# Patient Record
Sex: FEMALE | Race: WHITE
[De-identification: names, ages, dates, MRNs, and addresses within clinical notes are randomized per-mention and may not be internally consistent; named-entity substitution may affect disease eponyms.]

---

## 2018-08-09 ENCOUNTER — HOSPITAL ENCOUNTER (OUTPATIENT)
Dept: HOSPITAL 53 - M SDC | Age: 33
Discharge: HOME | End: 2018-08-09
Attending: OBSTETRICS & GYNECOLOGY
Payer: MEDICARE

## 2018-08-09 DIAGNOSIS — Z88.0: ICD-10-CM

## 2018-08-09 DIAGNOSIS — O02.1: Primary | ICD-10-CM

## 2018-08-09 DIAGNOSIS — Z88.2: ICD-10-CM

## 2018-08-09 LAB
ANION GAP: 8 MEQ/L (ref 8–16)
BLOOD UREA NITROGEN: 7 MG/DL (ref 7–18)
CALCIUM LEVEL: 9.9 MG/DL (ref 8.5–10.1)
CARBON DIOXIDE LEVEL: 27 MEQ/L (ref 21–32)
CHLORIDE LEVEL: 104 MEQ/L (ref 98–107)
CREATININE FOR GFR: 0.58 MG/DL (ref 0.55–1.3)
GFR SERPL CREATININE-BSD FRML MDRD: > 60 ML/MIN/{1.73_M2} (ref 60–?)
GLUCOSE, FASTING: 95 MG/DL (ref 70–100)
HEMATOCRIT: 39.2 % (ref 36–47)
HEMOGLOBIN: 13.5 G/DL (ref 12–15.5)
MEAN CORPUSCULAR HEMOGLOBIN: 31.6 PG (ref 27–33)
MEAN CORPUSCULAR HGB CONC: 34.4 G/DL (ref 32–36.5)
MEAN CORPUSCULAR VOLUME: 91.8 FL (ref 80–96)
NRBC BLD AUTO-RTO: 0 % (ref 0–0)
PLATELET COUNT, AUTOMATED: 323 10^3/UL (ref 150–450)
POTASSIUM SERUM: 4.1 MEQ/L (ref 3.5–5.1)
RED BLOOD COUNT: 4.27 10^6/UL (ref 4–5.4)
RED CELL DISTRIBUTION WIDTH: 12 % (ref 11.5–14.5)
SODIUM LEVEL: 139 MEQ/L (ref 136–145)
WHITE BLOOD COUNT: 6.6 10^3/UL (ref 4–10)

## 2018-08-09 PROCEDURE — 59820 CARE OF MISCARRIAGE: CPT

## 2018-08-09 RX ADMIN — ACETAMINOPHEN 1 MG: 650 SUPPOSITORY RECTAL at 19:50

## 2018-08-09 RX ADMIN — CLINDAMYCIN PHOSPHATE 1 MG: 900 INJECTION, SOLUTION INTRAVENOUS at 19:59

## 2019-10-11 ENCOUNTER — HOSPITAL ENCOUNTER (INPATIENT)
Dept: HOSPITAL 53 - M LDO | Age: 34
LOS: 2 days | Discharge: HOME | End: 2019-10-13
Attending: OBSTETRICS & GYNECOLOGY | Admitting: OBSTETRICS & GYNECOLOGY
Payer: MEDICARE

## 2019-10-11 VITALS — SYSTOLIC BLOOD PRESSURE: 165 MMHG | DIASTOLIC BLOOD PRESSURE: 81 MMHG

## 2019-10-11 VITALS — SYSTOLIC BLOOD PRESSURE: 103 MMHG | DIASTOLIC BLOOD PRESSURE: 57 MMHG

## 2019-10-11 VITALS — WEIGHT: 215.39 LBS | HEIGHT: 66 IN | BODY MASS INDEX: 34.62 KG/M2

## 2019-10-11 VITALS — SYSTOLIC BLOOD PRESSURE: 105 MMHG | DIASTOLIC BLOOD PRESSURE: 58 MMHG

## 2019-10-11 VITALS — SYSTOLIC BLOOD PRESSURE: 94 MMHG | DIASTOLIC BLOOD PRESSURE: 54 MMHG

## 2019-10-11 VITALS — DIASTOLIC BLOOD PRESSURE: 56 MMHG | SYSTOLIC BLOOD PRESSURE: 108 MMHG

## 2019-10-11 VITALS — SYSTOLIC BLOOD PRESSURE: 106 MMHG | DIASTOLIC BLOOD PRESSURE: 57 MMHG

## 2019-10-11 VITALS — SYSTOLIC BLOOD PRESSURE: 106 MMHG | DIASTOLIC BLOOD PRESSURE: 55 MMHG

## 2019-10-11 VITALS — DIASTOLIC BLOOD PRESSURE: 62 MMHG | SYSTOLIC BLOOD PRESSURE: 110 MMHG

## 2019-10-11 VITALS — DIASTOLIC BLOOD PRESSURE: 61 MMHG | SYSTOLIC BLOOD PRESSURE: 97 MMHG

## 2019-10-11 VITALS — DIASTOLIC BLOOD PRESSURE: 59 MMHG | SYSTOLIC BLOOD PRESSURE: 100 MMHG

## 2019-10-11 VITALS — SYSTOLIC BLOOD PRESSURE: 112 MMHG | DIASTOLIC BLOOD PRESSURE: 65 MMHG

## 2019-10-11 VITALS — DIASTOLIC BLOOD PRESSURE: 79 MMHG | SYSTOLIC BLOOD PRESSURE: 139 MMHG

## 2019-10-11 VITALS — SYSTOLIC BLOOD PRESSURE: 103 MMHG | DIASTOLIC BLOOD PRESSURE: 58 MMHG

## 2019-10-11 VITALS — DIASTOLIC BLOOD PRESSURE: 77 MMHG | SYSTOLIC BLOOD PRESSURE: 122 MMHG

## 2019-10-11 VITALS — DIASTOLIC BLOOD PRESSURE: 50 MMHG | SYSTOLIC BLOOD PRESSURE: 102 MMHG

## 2019-10-11 VITALS — SYSTOLIC BLOOD PRESSURE: 118 MMHG | DIASTOLIC BLOOD PRESSURE: 87 MMHG

## 2019-10-11 VITALS — DIASTOLIC BLOOD PRESSURE: 70 MMHG | SYSTOLIC BLOOD PRESSURE: 112 MMHG

## 2019-10-11 VITALS — DIASTOLIC BLOOD PRESSURE: 53 MMHG | SYSTOLIC BLOOD PRESSURE: 90 MMHG

## 2019-10-11 VITALS — SYSTOLIC BLOOD PRESSURE: 102 MMHG | DIASTOLIC BLOOD PRESSURE: 56 MMHG

## 2019-10-11 VITALS — SYSTOLIC BLOOD PRESSURE: 134 MMHG | DIASTOLIC BLOOD PRESSURE: 77 MMHG

## 2019-10-11 VITALS — SYSTOLIC BLOOD PRESSURE: 104 MMHG | DIASTOLIC BLOOD PRESSURE: 56 MMHG

## 2019-10-11 VITALS — SYSTOLIC BLOOD PRESSURE: 121 MMHG | DIASTOLIC BLOOD PRESSURE: 91 MMHG

## 2019-10-11 VITALS — SYSTOLIC BLOOD PRESSURE: 107 MMHG | DIASTOLIC BLOOD PRESSURE: 64 MMHG

## 2019-10-11 VITALS — SYSTOLIC BLOOD PRESSURE: 93 MMHG | DIASTOLIC BLOOD PRESSURE: 55 MMHG

## 2019-10-11 VITALS — SYSTOLIC BLOOD PRESSURE: 92 MMHG | DIASTOLIC BLOOD PRESSURE: 59 MMHG

## 2019-10-11 VITALS — DIASTOLIC BLOOD PRESSURE: 67 MMHG | SYSTOLIC BLOOD PRESSURE: 112 MMHG

## 2019-10-11 VITALS — DIASTOLIC BLOOD PRESSURE: 59 MMHG | SYSTOLIC BLOOD PRESSURE: 103 MMHG

## 2019-10-11 VITALS — DIASTOLIC BLOOD PRESSURE: 76 MMHG | SYSTOLIC BLOOD PRESSURE: 131 MMHG

## 2019-10-11 VITALS — DIASTOLIC BLOOD PRESSURE: 73 MMHG | SYSTOLIC BLOOD PRESSURE: 127 MMHG

## 2019-10-11 VITALS — DIASTOLIC BLOOD PRESSURE: 86 MMHG | SYSTOLIC BLOOD PRESSURE: 149 MMHG

## 2019-10-11 VITALS — DIASTOLIC BLOOD PRESSURE: 61 MMHG | SYSTOLIC BLOOD PRESSURE: 111 MMHG

## 2019-10-11 VITALS — DIASTOLIC BLOOD PRESSURE: 71 MMHG | SYSTOLIC BLOOD PRESSURE: 122 MMHG

## 2019-10-11 VITALS — DIASTOLIC BLOOD PRESSURE: 57 MMHG | SYSTOLIC BLOOD PRESSURE: 93 MMHG

## 2019-10-11 VITALS — DIASTOLIC BLOOD PRESSURE: 70 MMHG | SYSTOLIC BLOOD PRESSURE: 89 MMHG

## 2019-10-11 VITALS — DIASTOLIC BLOOD PRESSURE: 59 MMHG | SYSTOLIC BLOOD PRESSURE: 92 MMHG

## 2019-10-11 VITALS — DIASTOLIC BLOOD PRESSURE: 57 MMHG | SYSTOLIC BLOOD PRESSURE: 116 MMHG

## 2019-10-11 VITALS — DIASTOLIC BLOOD PRESSURE: 55 MMHG | SYSTOLIC BLOOD PRESSURE: 104 MMHG

## 2019-10-11 DIAGNOSIS — F41.9: ICD-10-CM

## 2019-10-11 DIAGNOSIS — Z3A.38: ICD-10-CM

## 2019-10-11 LAB
HCT VFR BLD AUTO: 39.4 % (ref 36–47)
HGB BLD-MCNC: 13.3 G/DL (ref 12–15.5)
MCH RBC QN AUTO: 32.8 PG (ref 27–33)
MCHC RBC AUTO-ENTMCNC: 33.8 G/DL (ref 32–36.5)
MCV RBC AUTO: 97.3 FL (ref 80–96)
PLATELET # BLD AUTO: 206 10^3/UL (ref 150–450)
RBC # BLD AUTO: 4.05 10^6/UL (ref 4–5.4)
WBC # BLD AUTO: 9 10^3/UL (ref 4–10)

## 2019-10-11 RX ADMIN — Medication PRN MG: at 20:00

## 2019-10-11 RX ADMIN — Medication PRN MG: at 20:04

## 2019-10-11 NOTE — HPE
DATE OF ADMISSION:  10/11/2019

 

A 34-year-old  5, para 1, abortio 3, last menstrual period (LMP) of

2019, estimated date of confinement (EDC) 10/21/2019 at 38 and 5 in active

labor.  GBS positive.  Risk factors are she is GBS positive, she has a body mass

index (BMI) of 30, and she has significant anxiety.

 

PAST HISTORY:  In May 2014 at 39 weeks, spontaneous vaginal delivery, female, 7

pounds 12 ounces.  2017 at 6 weeks, spontaneous  with dilatation and

curettage (D and C).  2017, methotrexate-administered  and 2018 a spontaneous  with a D and C.

 

LABORATORY DATA:

A positive, HIV negative, hepatitis negative, RPR negative, rubella immune,

Varicella immune.  Urine negative.  Gonorrhea and chlamydia negative.  One-hour

initial glucose was 123, 28 GTT was 106, and GBS was positive.

 

Temperature is 98.0, pulse 95, respirations 18, blood pressure 112/67.  Urine

1015, pH 7, +1 leukocyte esterase, blood 250.

 

PHYSICAL EXAMINATION:  Distressed.  Symphysis fundus height is 40, vertex,

occiput transverse (OT), 5-6 cm, stretchy, 100% effaced, -3 station with bulging

membranes.  The rest of the examination unremarkable.  She is normocephalic,

atraumatic.  Neck:  Full range of motion.  Pupils equal and reactive to light.

Distal pulses are symmetric.  No evidence of deep vein thrombosis (DVT),

pulmonary embolism (PE) or superficial phlebitis.  Chest is clear bilaterally to

bases.  No wheezes or rhonchi.  No costovertebral angle (CVA) tenderness.

Abdomen is soft.  Four-quadrant bowel sounds are noted.  No rashes, lesions, or

pruritus.  No arthralgia or myalgia.  No complaint of joint pain.  No complaint

of cough, wheeze, shortness of breath, or dyspnea on exertion.  No nausea,

vomiting, diarrhea, or constipation.  No urgency or frequency.  No GYN history.

 

PAST MEDICAL HISTORY:  She has anxiety and is weaning herself off of Effexor.

 

SURGICAL HISTORY:  D and C times two.

 

In summary, we have a term gestation in active labor.  GBS positive, sensitive to

clindamycin.  The patient has allergies to PENICILLIN and SULFA.  Our plan is to

hydrate the patient.  Prophylaxis if possible for GBS and epidural for pain

management.

## 2019-10-12 VITALS — DIASTOLIC BLOOD PRESSURE: 53 MMHG | SYSTOLIC BLOOD PRESSURE: 104 MMHG

## 2019-10-12 VITALS — DIASTOLIC BLOOD PRESSURE: 66 MMHG | SYSTOLIC BLOOD PRESSURE: 111 MMHG

## 2019-10-12 VITALS — DIASTOLIC BLOOD PRESSURE: 67 MMHG | SYSTOLIC BLOOD PRESSURE: 111 MMHG

## 2019-10-12 VITALS — SYSTOLIC BLOOD PRESSURE: 104 MMHG | DIASTOLIC BLOOD PRESSURE: 58 MMHG

## 2019-10-12 VITALS — DIASTOLIC BLOOD PRESSURE: 53 MMHG | SYSTOLIC BLOOD PRESSURE: 95 MMHG

## 2019-10-12 VITALS — SYSTOLIC BLOOD PRESSURE: 116 MMHG | DIASTOLIC BLOOD PRESSURE: 68 MMHG

## 2019-10-12 VITALS — SYSTOLIC BLOOD PRESSURE: 112 MMHG | DIASTOLIC BLOOD PRESSURE: 57 MMHG

## 2019-10-12 VITALS — DIASTOLIC BLOOD PRESSURE: 60 MMHG | SYSTOLIC BLOOD PRESSURE: 96 MMHG

## 2019-10-12 VITALS — SYSTOLIC BLOOD PRESSURE: 116 MMHG | DIASTOLIC BLOOD PRESSURE: 67 MMHG

## 2019-10-12 VITALS — DIASTOLIC BLOOD PRESSURE: 71 MMHG | SYSTOLIC BLOOD PRESSURE: 118 MMHG

## 2019-10-12 VITALS — SYSTOLIC BLOOD PRESSURE: 107 MMHG | DIASTOLIC BLOOD PRESSURE: 67 MMHG

## 2019-10-12 LAB
BASE EXCESS BLDCOA CALC-SCNC: -1.5 MMOL/L
BASE EXCESS BLDCOV CALC-SCNC: -1.5 MMOL/L
CO2 BLDCOA CALC-SCNC: 26.9 MEQ/L
CO2 BLDCOV CALC-SCNC: 27.4 MEQ/L
HCO3 STD BLDCOA-SCNC: 21.8 MEQ/L
HCO3 STD BLDCOA-SCNC: 25.3 MEQ/L
HCO3 STD BLDCOV-SCNC: 21.6 MEQ/L
HCO3 STD BLDCOV-SCNC: 25.8 MEQ/L
PCO2 BLDCOA: 50.8 MMHG
PCO2 BLDCOV: 53.1 MMHG
PH BLDCOA: 7.32 UNITS
PH BLDCOV: 7.3 UNITS
PO2 BLDCOV: 15.6 MMHG
SAO2 % BLDCOA: 38 %
SAO2 % BLDCOV: 31.7 %

## 2019-10-12 RX ADMIN — IBUPROFEN PRN MG: 800 TABLET, FILM COATED ORAL at 14:55

## 2019-10-12 RX ADMIN — IBUPROFEN PRN MG: 800 TABLET, FILM COATED ORAL at 23:00

## 2019-10-12 RX ADMIN — Medication SCH TAB: at 09:08

## 2019-10-12 RX ADMIN — ACETAMINOPHEN PRN MG: 500 TABLET ORAL at 09:09

## 2019-10-12 RX ADMIN — IBUPROFEN PRN MG: 800 TABLET, FILM COATED ORAL at 04:03

## 2019-10-12 NOTE — DN
DATE:  10/12/2019

 

This lady came in in active labor at 35 weeks of gestation.  Had an epidural in

place. Had a spontaneous vaginal delivery live-birth female infant, weighing 8

pounds 5 ounces, 3770 grams.  Apgar scores of 9 and 9 at one and five minutes,

respectively.  Arterial and venous pH were performed.  Cord was loose around the

neck once and terminal meconium at delivery.  Placenta delivered spontaneously

thereafter.  Three-vessel cord, membranes, and tissues intact.  Uterus contracted

well down under Pitocin.  The examination revealed anterior, posterior, and

lateral walls were intact.  Sphincter was tight.  No tears were noted.  The

patient and baby tolerating procedure well.

## 2019-10-13 VITALS — DIASTOLIC BLOOD PRESSURE: 69 MMHG | SYSTOLIC BLOOD PRESSURE: 106 MMHG

## 2019-10-13 LAB
HCT VFR BLD AUTO: 35.6 % (ref 36–47)
HGB BLD-MCNC: 11.7 G/DL (ref 12–15.5)
MCH RBC QN AUTO: 32 PG (ref 27–33)
MCHC RBC AUTO-ENTMCNC: 32.9 G/DL (ref 32–36.5)
MCV RBC AUTO: 97.3 FL (ref 80–96)
PLATELET # BLD AUTO: 178 10^3/UL (ref 150–450)
RBC # BLD AUTO: 3.66 10^6/UL (ref 4–5.4)
WBC # BLD AUTO: 8 10^3/UL (ref 4–10)

## 2019-10-13 RX ADMIN — ACETAMINOPHEN PRN MG: 500 TABLET ORAL at 03:11

## 2019-10-13 RX ADMIN — Medication SCH TAB: at 09:31

## 2019-10-13 NOTE — IPNPDOC
Text Note


Date of Service


The patient was seen on 10/13/19.





NOTE


OB Considerations:


Anxiety, previously on Effexor, follows by outside 


BMI 30


GBS+





35y/o [id number] s/p  @ 38+6 wks following admission for active labor.  This 

morning, patient complains of mild back soreness.  Denies fevers/chills.  

Ambulating and voiding without difficulty.  Tolerating regular diet.  Lochia 

diminishing.  Bottle feeding - infant with some difficulty with feeds.





PE: Reviewed.  Normotensive, afebrile.


Gen: Alert and oriented


Resp: non labored breathing


CV: well perfused


Abd: Soft, NT/ND.  U-2.


Ext: No edema, erythema or calf tenderness





HCT 39.4 -> 35.6





A/p: 35y/o [id number] s/p  @ 38+6 wks, doing well on PPD#2


-Encourage hydration, ambulation


-Feeding: Bottle


-Contraception: Desires KAMI at 4-6 weeks postpartum


-Pain well controlled


-A Pos, Rubella immune


-Anticipate discharge today if infant ready for discharge





VS,Fishbone, I+O


VS, Fishbone, I+O


Laboratory Tests


10/13/19 06:22








Red Blood Count 3.66 L, Mean Corpuscular Volume 97.3 H, Mean Corpuscular 

Hemoglobin 32.0, Mean Corpuscular Hemoglobin Concent 32.9, Red Cell Distribution

Width 13.7








Vital Signs








  Date Time  Temp Pulse Resp B/P (MAP) Pulse Ox O2 Delivery O2 Flow Rate FiO2


 


10/13/19 06:00 97.5 63 18 106/69 (81)    














I&O- Last 24 Hours up to 6 AM 


 


 10/13/19





 06:00


 


Intake Total 360 ml


 


Output Total 450 ml


 


Balance -90 ml

















Nicky Remy MD               Oct 13, 2019 10:17

## 2019-10-13 NOTE — OBDS
[facility] Obstetrical Discharge Sum.


Obstetrical Discharge Summary


Obstetrician/Provider:  Nicky Remy MD


Date:  Oct 13, 2019


Time:  10:17


:  5


Term:  2


Pre-term:  0


Abortions:  3


Livin


VDRL:  Non-Reactive


Rh:  Positive


Rubella:  Immune


Labor


Admitted in active labor.


Delivery


Uncomplicated  @ 38+5 wks, loose cord x1, terminal meconium.


Infant Sex:  Female


Infant Weight:  pounds (8), ounces (5), grams (3770)


Anesthesia:  Regional Anesthesia


Episiotomy


None





A/P, Post Partum Course


List any complications


Admission diagnosis: Labor


Discharge diagnosis: Single live birth


Condition at Discharge: Stable


Discharge Instructions: Home


Activity: As tolerated.  Discussed pelvic rest x6 weeks.


Diet: Regular


Medications: Given to patient.  Will need KAMI prescribed at 6 wks postpartum.  

Discussed need for abstinence until then.


Follow-up: at 6 weeks at [facility] - patient to schedule


Other: Long discussion about postpartum depression and return precautions had 

with patient.











Nicky Remy MD               Oct 13, 2019 10:20